# Patient Record
Sex: FEMALE | URBAN - METROPOLITAN AREA
[De-identification: names, ages, dates, MRNs, and addresses within clinical notes are randomized per-mention and may not be internally consistent; named-entity substitution may affect disease eponyms.]

---

## 2021-03-01 ENCOUNTER — OFFICE VISIT (OUTPATIENT)
Dept: URBAN - METROPOLITAN AREA CLINIC 27 | Facility: CLINIC | Age: 71
End: 2021-03-01
Payer: MEDICARE

## 2021-03-01 PROCEDURE — 92235 FLUORESCEIN ANGRPH MLTIFRAME: CPT | Performed by: OPHTHALMOLOGY

## 2021-03-01 PROCEDURE — 99204 OFFICE O/P NEW MOD 45 MIN: CPT | Performed by: OPHTHALMOLOGY

## 2021-03-01 PROCEDURE — 92250 FUNDUS PHOTOGRAPHY W/I&R: CPT | Performed by: OPHTHALMOLOGY

## 2021-03-01 PROCEDURE — 92134 CPTRZ OPH DX IMG PST SGM RTA: CPT | Performed by: OPHTHALMOLOGY

## 2021-03-01 ASSESSMENT — INTRAOCULAR PRESSURE
OD: 10
OS: 12

## 2021-03-01 NOTE — IMPRESSION/PLAN
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 Plan: Exam/photos/OCT show DBH c/w severe NPDR with CSDME OD. FA sweeps 3/1/21  show staining of macular MAs with late leakage, large areas of ischemia w/o NVE. Plan as above. Last A1c 8.8.

## 2021-03-01 NOTE — IMPRESSION/PLAN
Impression: Type 2 diabetes mellitus with proliferative diabetic retinopathy with macular edema, left eye: Y18.9118. Plan: Exam/photos/OCT show NVE/NVD c/w active PDR with CSDME OS. FA sweeps 3/1/21 show leakage in the macula and peripheral ischemia with nasal NVE. Currently, there is no NVI and the IOP is within normal limits. The Dx, NHx, and Px PDR and DME were discussed at length. Reviewed options of observation, anti-VEGF, steroids, and laser; recommend anti-VEGF series along with sector PRP. The patient understands that the goal of treatment is not to improve vision, but to reduce the likelihood of severe vision loss. The patient was urged to work closely with their PCP to avoid systemic complications of diabetic disease. 

1 week, Lucentis OU 1/3
2-3 weeks, nasal PRP OS (PHX-B)

## 2021-03-08 ENCOUNTER — PROCEDURE (OUTPATIENT)
Dept: URBAN - METROPOLITAN AREA CLINIC 27 | Facility: CLINIC | Age: 71
End: 2021-03-08
Payer: MEDICARE

## 2021-03-08 ASSESSMENT — INTRAOCULAR PRESSURE
OD: 14
OS: 15

## 2021-04-05 ENCOUNTER — PROCEDURE (OUTPATIENT)
Dept: URBAN - METROPOLITAN AREA CLINIC 27 | Facility: CLINIC | Age: 71
End: 2021-04-05
Payer: MEDICARE

## 2021-04-05 ASSESSMENT — INTRAOCULAR PRESSURE
OD: 20
OS: 18

## 2021-04-21 ENCOUNTER — PROCEDURE (OUTPATIENT)
Dept: URBAN - METROPOLITAN AREA CLINIC 7 | Facility: CLINIC | Age: 71
End: 2021-04-21
Payer: MEDICARE

## 2021-04-21 PROCEDURE — 67228 TREATMENT X10SV RETINOPATHY: CPT | Performed by: OPHTHALMOLOGY

## 2021-04-21 ASSESSMENT — INTRAOCULAR PRESSURE
OS: 17
OD: 19

## 2021-06-08 ENCOUNTER — OFFICE VISIT (OUTPATIENT)
Dept: URBAN - METROPOLITAN AREA CLINIC 27 | Facility: CLINIC | Age: 71
End: 2021-06-08
Payer: MEDICARE

## 2021-06-08 DIAGNOSIS — E11.3411 TYPE 2 DIABETES MELLITUS WITH SEVERE NONPROLIFERATIVE DIABETIC RETINOPATHY WITH MACULAR EDEMA, RIGHT EYE: ICD-10-CM

## 2021-06-08 DIAGNOSIS — E11.3512 TYPE 2 DIABETES MELLITUS WITH PROLIFERATIVE DIABETIC RETINOPATHY WITH MACULAR EDEMA, LEFT EYE: Primary | ICD-10-CM

## 2021-06-08 PROCEDURE — 92012 INTRM OPH EXAM EST PATIENT: CPT | Performed by: OPHTHALMOLOGY

## 2021-06-08 PROCEDURE — 92134 CPTRZ OPH DX IMG PST SGM RTA: CPT | Performed by: OPHTHALMOLOGY

## 2021-06-08 ASSESSMENT — INTRAOCULAR PRESSURE
OD: 15
OS: 15

## 2021-06-08 NOTE — IMPRESSION/PLAN
Impression: severe NPDR w DME OD Plan: Exam/OCT show severe NPDR with CSDME OD s/p Sandro series. FA sweeps 3/1/21 showed staining of macular MAs with late leakage, large areas of ischemia w/o NVE. Plan as above. Last A1c 8. 8.

## 2021-06-08 NOTE — IMPRESSION/PLAN
Impression: PDR w DME OS
s/p sector PRP 4/21/21 Plan: Exam/OCT show PDR with CSDME OS s/p Sandro series. FA sweeps 3/1/21 showed leakage in the macula and peripheral ischemia with nasal NVE. Reviewed options of observation, anti-VEGF, steroids, and laser; recommend 2nd Lucentis series. Also submit Ozurdex BI for any incomplete response. Cont BS/BP/chol control. 

4 weeks, Lucentis OU 2/3 (repeat FA Sept)

## 2021-07-12 ENCOUNTER — PROCEDURE (OUTPATIENT)
Dept: URBAN - METROPOLITAN AREA CLINIC 27 | Facility: CLINIC | Age: 71
End: 2021-07-12
Payer: MEDICARE

## 2021-07-12 ASSESSMENT — INTRAOCULAR PRESSURE
OS: 13
OD: 14

## 2021-08-16 ENCOUNTER — PROCEDURE (OUTPATIENT)
Dept: URBAN - METROPOLITAN AREA CLINIC 27 | Facility: CLINIC | Age: 71
End: 2021-08-16
Payer: MEDICARE

## 2021-08-16 ASSESSMENT — INTRAOCULAR PRESSURE
OD: 14
OS: 14

## 2021-10-26 ENCOUNTER — OFFICE VISIT (OUTPATIENT)
Dept: URBAN - METROPOLITAN AREA CLINIC 27 | Facility: CLINIC | Age: 71
End: 2021-10-26
Payer: MEDICARE

## 2021-10-26 DIAGNOSIS — H25.13 AGE-RELATED NUCLEAR CATARACT, BILATERAL: ICD-10-CM

## 2021-10-26 PROCEDURE — 92250 FUNDUS PHOTOGRAPHY W/I&R: CPT | Performed by: OPHTHALMOLOGY

## 2021-10-26 PROCEDURE — 99214 OFFICE O/P EST MOD 30 MIN: CPT | Performed by: OPHTHALMOLOGY

## 2021-10-26 PROCEDURE — 92134 CPTRZ OPH DX IMG PST SGM RTA: CPT | Performed by: OPHTHALMOLOGY

## 2021-10-26 PROCEDURE — 92235 FLUORESCEIN ANGRPH MLTIFRAME: CPT | Performed by: OPHTHALMOLOGY

## 2021-10-26 ASSESSMENT — INTRAOCULAR PRESSURE
OS: 14
OD: 15

## 2021-10-26 NOTE — IMPRESSION/PLAN
Impression: severe NPDR w DME OD Plan: Exam/photos/OCT show severe NPDR with CSDME OD. FA sweeps 10/26/21 shows staining of macular MAs with late leakage, large areas of ischemia w/o NVE. Plan as above. Last A1c 8. 8.

## 2021-10-26 NOTE — IMPRESSION/PLAN
Impression: PDR w DME OS
s/p sector PRP 4/21/21 Plan: Pt missed last appt. Exam/photos/OCT show PDR with recurrent vs persistent CSDME OS. FA sweeps 10/26/21 shows leakage in the macula, peripheral ischemia w/o NVE OS. Reviewed options of observation, anti-VEGF, steroids, and laser; recommend Lucentis OU, check response 4 weeks later and switch to Ozurdex if DME is minmally responsive. No auth, will schedule. Cont BS/BP/chol control. 

3 weeks, Lucentis OU; then 4 weeks, OCT OU, reeval DME (Sandro vs Oz)

## 2021-11-15 ENCOUNTER — PROCEDURE (OUTPATIENT)
Dept: URBAN - METROPOLITAN AREA CLINIC 27 | Facility: CLINIC | Age: 71
End: 2021-11-15
Payer: MEDICARE

## 2021-11-15 ASSESSMENT — INTRAOCULAR PRESSURE
OS: 13
OD: 14

## 2022-01-18 ENCOUNTER — OFFICE VISIT (OUTPATIENT)
Dept: URBAN - METROPOLITAN AREA CLINIC 27 | Facility: CLINIC | Age: 72
End: 2022-01-18
Payer: MEDICARE

## 2022-01-18 PROCEDURE — 92134 CPTRZ OPH DX IMG PST SGM RTA: CPT | Performed by: OPHTHALMOLOGY

## 2022-01-18 PROCEDURE — 99212 OFFICE O/P EST SF 10 MIN: CPT | Performed by: OPHTHALMOLOGY

## 2022-01-18 ASSESSMENT — INTRAOCULAR PRESSURE
OD: 21
OS: 18

## 2022-01-18 NOTE — IMPRESSION/PLAN
Impression: severe NPDR w DME OD Plan: Exam/OCT show severe NPDR with CSDME OD. FA sweeps 10/26/21 showed staining of macular MAs with late leakage, large areas of ischemia w/o NVE. Plan as above. Last A1c 8. 8.

## 2022-01-18 NOTE — IMPRESSION/PLAN
Impression: PDR w DME OS
s/p sector PRP 4/21/21 Plan: Pt missed last appt. Exam/OCT show PDR with recurrent vs persistent CSDME OS. FA sweeps 10/26/21 showed leakage in the macula, peripheral ischemia w/o NVE OS. Reviewed options of observation, anti-VEGF, steroids, and laser; given poor compliance with f/u, recommend Ozurdex OU today. Cont BS/BP/chol control. 

4 weeks, OCT OU, reeval DME (Sandro vs Oz)

## 2022-04-26 ENCOUNTER — OFFICE VISIT (OUTPATIENT)
Dept: URBAN - METROPOLITAN AREA CLINIC 27 | Facility: CLINIC | Age: 72
End: 2022-04-26
Payer: MEDICARE

## 2022-04-26 DIAGNOSIS — H25.13 AGE-RELATED NUCLEAR CATARACT, BILATERAL: ICD-10-CM

## 2022-04-26 DIAGNOSIS — E11.3411 TYPE 2 DIABETES MELLITUS WITH SEVERE NONPROLIFERATIVE DIABETIC RETINOPATHY WITH MACULAR EDEMA, RIGHT EYE: ICD-10-CM

## 2022-04-26 DIAGNOSIS — E11.3512 TYPE 2 DIABETES MELLITUS WITH PROLIFERATIVE DIABETIC RETINOPATHY WITH MACULAR EDEMA, LEFT EYE: Primary | ICD-10-CM

## 2022-04-26 PROCEDURE — 92014 COMPRE OPH EXAM EST PT 1/>: CPT | Performed by: OPHTHALMOLOGY

## 2022-04-26 PROCEDURE — 92134 CPTRZ OPH DX IMG PST SGM RTA: CPT | Performed by: OPHTHALMOLOGY

## 2022-04-26 ASSESSMENT — INTRAOCULAR PRESSURE
OD: 15
OS: 13

## 2022-04-26 NOTE — IMPRESSION/PLAN
Impression: PDR w DME OS
 - s/p sector PRP 4/21/21 Plan: Pt missed last appt (COVID). Exam/OCT show PDR with recurrent vs persistent CSDME OS. FA sweeps 10/26/21 showed leakage in the macula, peripheral ischemia w/o NVE OS. Reviewed options of observation, anti-VEGF, steroids, and laser; given poor compliance with f/u, recommend Ozurdex OU today. Cont BS/BP/chol control. 

4 weeks, photos/FA sweeps/OCT OU, reeval DME (Sandro vs Oz)

## 2022-07-25 ENCOUNTER — OFFICE VISIT (OUTPATIENT)
Dept: URBAN - METROPOLITAN AREA CLINIC 27 | Facility: CLINIC | Age: 72
End: 2022-07-25
Payer: MEDICARE

## 2022-07-25 DIAGNOSIS — E11.3411 TYPE 2 DIABETES MELLITUS WITH SEVERE NONPROLIFERATIVE DIABETIC RETINOPATHY WITH MACULAR EDEMA, RIGHT EYE: ICD-10-CM

## 2022-07-25 DIAGNOSIS — H04.129 DRY EYE SYNDROME OF LACRIMAL GLAND: ICD-10-CM

## 2022-07-25 DIAGNOSIS — E11.3512 TYPE 2 DIABETES MELLITUS WITH PROLIFERATIVE DIABETIC RETINOPATHY WITH MACULAR EDEMA, LEFT EYE: Primary | ICD-10-CM

## 2022-07-25 PROCEDURE — 92250 FUNDUS PHOTOGRAPHY W/I&R: CPT | Performed by: OPHTHALMOLOGY

## 2022-07-25 PROCEDURE — 92235 FLUORESCEIN ANGRPH MLTIFRAME: CPT | Performed by: OPHTHALMOLOGY

## 2022-07-25 PROCEDURE — 92012 INTRM OPH EXAM EST PATIENT: CPT | Performed by: OPHTHALMOLOGY

## 2022-07-25 PROCEDURE — 92134 CPTRZ OPH DX IMG PST SGM RTA: CPT | Performed by: OPHTHALMOLOGY

## 2022-07-25 ASSESSMENT — INTRAOCULAR PRESSURE
OD: 14
OS: 16

## 2022-07-25 NOTE — IMPRESSION/PLAN
Impression: severe NPDR w DME OD Plan: Exam/OCT show severe NPDR with CSDME OD. Photos 7/25/22 showed severe NPDR. FA sweeps 7/25/22 showed macular  leakage, large areas of ischemia w/o NVE OD. Plan as above. Last A1c 8. 8.

## 2022-07-25 NOTE — IMPRESSION/PLAN
Impression: PDR w DME OS
 - s/p sector PRP 4/21/21 Plan: Exam/OCT show PDR with CSDME OS. Photos 7/25/22 showed PDR. FA sweeps 7/25/22 showed leakage in the macula, peripheral ischemia w/o nasal NVE OS. Reviewed options of observation, anti-VEGF, steroids, and laser; recommend Ozurdex OU today. Cont BS/BP/chol control. 

6 weeks, OCT OU, reeval DME (Sandro vs Oz)

## 2022-09-26 ENCOUNTER — OFFICE VISIT (OUTPATIENT)
Dept: URBAN - METROPOLITAN AREA CLINIC 27 | Facility: CLINIC | Age: 72
End: 2022-09-26
Payer: MEDICARE

## 2022-09-26 DIAGNOSIS — E11.3411 TYPE 2 DIABETES MELLITUS WITH SEVERE NONPROLIFERATIVE DIABETIC RETINOPATHY WITH MACULAR EDEMA, RIGHT EYE: ICD-10-CM

## 2022-09-26 DIAGNOSIS — E11.3512 TYPE 2 DIABETES MELLITUS WITH PROLIFERATIVE DIABETIC RETINOPATHY WITH MACULAR EDEMA, LEFT EYE: Primary | ICD-10-CM

## 2022-09-26 DIAGNOSIS — H04.129 DRY EYE SYNDROME OF LACRIMAL GLAND: ICD-10-CM

## 2022-09-26 PROCEDURE — 92134 CPTRZ OPH DX IMG PST SGM RTA: CPT | Performed by: OPHTHALMOLOGY

## 2022-09-26 PROCEDURE — 99212 OFFICE O/P EST SF 10 MIN: CPT | Performed by: OPHTHALMOLOGY

## 2022-09-26 ASSESSMENT — INTRAOCULAR PRESSURE
OD: 20
OS: 12

## 2022-09-26 NOTE — IMPRESSION/PLAN
Impression: severe NPDR w DME OD Plan: Exam/OCT show severe NPDR with CSDME OD s/p Ozurdex 7/25/22. Photos 7/25/22 showed severe NPDR. FA sweeps 7/25/22 showed macular  leakage, large areas of ischemia w/o NVE OD. Plan as above. Last A1c 8. 8.

## 2022-09-26 NOTE — IMPRESSION/PLAN
Impression: PDR w DME OS
 - s/p sector PRP 4/21/21 Plan: Exam/OCT show PDR with CSDME OS s/p Ozurdex 7/25/22. Photos 7/25/22 showed PDR. FA sweeps 7/25/22 showed leakage in the macula, peripheral ischemia w/o nasal NVE OS. Reviewed options of observation, anti-VEGF, steroids, and laser; recommend Lucentis OU today. Cont BS/BP/chol control. 

6 weeks, OCT OU, reeval DME (Sandro vs Oz)

## 2022-11-14 ENCOUNTER — OFFICE VISIT (OUTPATIENT)
Dept: URBAN - METROPOLITAN AREA CLINIC 27 | Facility: CLINIC | Age: 72
End: 2022-11-14
Payer: MEDICARE

## 2022-11-14 DIAGNOSIS — E11.3411 TYPE 2 DIABETES MELLITUS WITH SEVERE NONPROLIFERATIVE DIABETIC RETINOPATHY WITH MACULAR EDEMA, RIGHT EYE: ICD-10-CM

## 2022-11-14 DIAGNOSIS — E11.3512 TYPE 2 DIABETES MELLITUS WITH PROLIFERATIVE DIABETIC RETINOPATHY WITH MACULAR EDEMA, LEFT EYE: Primary | ICD-10-CM

## 2022-11-14 DIAGNOSIS — H25.13 AGE-RELATED NUCLEAR CATARACT, BILATERAL: ICD-10-CM

## 2022-11-14 DIAGNOSIS — H04.129 DRY EYE SYNDROME OF LACRIMAL GLAND: ICD-10-CM

## 2022-11-14 PROCEDURE — 99214 OFFICE O/P EST MOD 30 MIN: CPT | Performed by: OPHTHALMOLOGY

## 2022-11-14 PROCEDURE — 92134 CPTRZ OPH DX IMG PST SGM RTA: CPT | Performed by: OPHTHALMOLOGY

## 2022-11-14 ASSESSMENT — INTRAOCULAR PRESSURE
OD: 15
OS: 13

## 2022-11-14 NOTE — IMPRESSION/PLAN
Impression: severe NPDR w DME OD Plan: Exam/OCT show severe NPDR with CSDME OD s/p Ozurdex 7/25/22 and Lucentis 9/26/22. Photos 7/25/22 showed severe NPDR. FA sweeps 7/25/22 showed macular  leakage, large areas of ischemia w/o NVE OD. Plan as above. Last A1c 8. 8.

## 2022-11-14 NOTE — IMPRESSION/PLAN
Impression: PDR w DME OS
 - s/p sector PRP 4/21/21 Plan: Exam/OCT show PDR with CSDME OS s/p Ozurdex 7/25/22 and Lucentis 9/26/22. Photos 7/25/22 showed PDR. FA sweeps 7/25/22 showed leakage in the macula, peripheral ischemia w/o nasal NVE OS. Reviewed options of observation, anti-VEGF, steroids, and laser; recommend Ozurdex OU today. Given persistence, submit Vabysmo BI. Cont BS/BP/chol control. 

4-6 weeks, OCT OU, reeval DME (Vabysmo vs Oz)

## 2023-02-28 ENCOUNTER — OFFICE VISIT (OUTPATIENT)
Dept: URBAN - METROPOLITAN AREA CLINIC 27 | Facility: CLINIC | Age: 73
End: 2023-02-28
Payer: MEDICARE

## 2023-02-28 DIAGNOSIS — H04.129 DRY EYE SYNDROME OF LACRIMAL GLAND: ICD-10-CM

## 2023-02-28 DIAGNOSIS — E11.3411 TYPE 2 DIABETES MELLITUS WITH SEVERE NONPROLIFERATIVE DIABETIC RETINOPATHY WITH MACULAR EDEMA, RIGHT EYE: ICD-10-CM

## 2023-02-28 DIAGNOSIS — E11.3512 TYPE 2 DIABETES MELLITUS WITH PROLIFERATIVE DIABETIC RETINOPATHY WITH MACULAR EDEMA, LEFT EYE: Primary | ICD-10-CM

## 2023-02-28 PROCEDURE — 99213 OFFICE O/P EST LOW 20 MIN: CPT | Performed by: OPHTHALMOLOGY

## 2023-02-28 PROCEDURE — 92134 CPTRZ OPH DX IMG PST SGM RTA: CPT | Performed by: OPHTHALMOLOGY

## 2023-02-28 ASSESSMENT — INTRAOCULAR PRESSURE
OD: 18
OS: 19

## 2023-02-28 NOTE — IMPRESSION/PLAN
Impression: severe NPDR w DME OD Plan: Exam/OCT show severe NPDR with CSDME OD s/p Ozurdex 11/14/22. Photos 7/25/22 showed severe NPDR. FA sweeps 7/25/22 showed macular  leakage, large areas of ischemia w/o NVE OD. Plan as above. Last A1c 8. 8.

## 2023-02-28 NOTE — IMPRESSION/PLAN
Impression: PDR w DME OS
 - s/p sector PRP 4/21/21 Plan: Pt lost to f/u. Exam/OCT show PDR with CSDME OS s/p Ozurdex 11/14/22. Photos 7/25/22 showed PDR. FA sweeps 7/25/22 showed leakage in the macula, peripheral ischemia w/o nasal NVE OS. Reviewed options of observation, anti-VEGF, steroids, and laser; recommend Vabysmo series OU. Cont BS/BP/chol control. 

4-6 weeks, OCT/Vabysmo OU 2/3

## 2023-05-15 ENCOUNTER — OFFICE VISIT (OUTPATIENT)
Dept: URBAN - METROPOLITAN AREA CLINIC 27 | Facility: CLINIC | Age: 73
End: 2023-05-15
Payer: MEDICARE

## 2023-05-15 DIAGNOSIS — E11.3411 TYPE 2 DIABETES MELLITUS WITH SEVERE NONPROLIFERATIVE DIABETIC RETINOPATHY WITH MACULAR EDEMA, RIGHT EYE: ICD-10-CM

## 2023-05-15 DIAGNOSIS — E11.3512 TYPE 2 DIABETES MELLITUS WITH PROLIFERATIVE DIABETIC RETINOPATHY WITH MACULAR EDEMA, LEFT EYE: Primary | ICD-10-CM

## 2023-05-15 PROCEDURE — 92134 CPTRZ OPH DX IMG PST SGM RTA: CPT | Performed by: OPHTHALMOLOGY

## 2023-05-15 ASSESSMENT — INTRAOCULAR PRESSURE
OD: 18
OS: 19

## 2023-06-26 ENCOUNTER — PROCEDURE (OUTPATIENT)
Dept: URBAN - METROPOLITAN AREA CLINIC 27 | Facility: CLINIC | Age: 73
End: 2023-06-26
Payer: MEDICARE

## 2023-06-26 DIAGNOSIS — E11.3411 TYPE 2 DIABETES MELLITUS WITH SEVERE NONPROLIFERATIVE DIABETIC RETINOPATHY WITH MACULAR EDEMA, RIGHT EYE: ICD-10-CM

## 2023-06-26 DIAGNOSIS — E11.3512 TYPE 2 DIABETES MELLITUS WITH PROLIFERATIVE DIABETIC RETINOPATHY WITH MACULAR EDEMA, LEFT EYE: Primary | ICD-10-CM

## 2023-06-26 PROCEDURE — 92134 CPTRZ OPH DX IMG PST SGM RTA: CPT | Performed by: OPHTHALMOLOGY

## 2023-06-26 ASSESSMENT — INTRAOCULAR PRESSURE
OS: 14
OD: 19

## 2023-06-26 NOTE — IMPRESSION/PLAN
Impression: PDR w DME OS
 - s/p sector PRP 4/21/21 Plan: Exam/OCT show PDR with temporal CSDME OS. Photos 7/25/22 showed PDR. FA sweeps 7/25/22 showed leakage in the macula, peripheral ischemia w/o nasal NVE OS. Reviewed options of observation, anti-VEGF, steroids, and laser; recommend Vabysmo OU. Cont BS/BP/chol control. 

4-6 weeks, photos/FA sweeps/OCT OU, reeval DME (Vabysmo vs Ozurdex)

## 2023-06-26 NOTE — IMPRESSION/PLAN
Impression: severe NPDR w DME OD Plan: Exam/OCT show severe NPDR with temporal CSDME OD. Photos 7/25/22 showed severe NPDR. FA sweeps 7/25/22 showed macular  leakage, large areas of ischemia w/o NVE OD. Plan as above. Last A1c 8. 8.

## 2023-09-11 ENCOUNTER — OFFICE VISIT (OUTPATIENT)
Dept: URBAN - METROPOLITAN AREA CLINIC 27 | Facility: CLINIC | Age: 73
End: 2023-09-11
Payer: MEDICARE

## 2023-09-11 DIAGNOSIS — E11.3411 TYPE 2 DIABETES MELLITUS WITH SEVERE NONPROLIFERATIVE DIABETIC RETINOPATHY WITH MACULAR EDEMA, RIGHT EYE: ICD-10-CM

## 2023-09-11 DIAGNOSIS — H25.13 AGE-RELATED NUCLEAR CATARACT, BILATERAL: ICD-10-CM

## 2023-09-11 DIAGNOSIS — H04.129 DRY EYE SYNDROME OF LACRIMAL GLAND: ICD-10-CM

## 2023-09-11 DIAGNOSIS — E11.3512 TYPE 2 DIABETES MELLITUS WITH PROLIFERATIVE DIABETIC RETINOPATHY WITH MACULAR EDEMA, LEFT EYE: Primary | ICD-10-CM

## 2023-09-11 PROCEDURE — 92235 FLUORESCEIN ANGRPH MLTIFRAME: CPT | Performed by: OPHTHALMOLOGY

## 2023-09-11 PROCEDURE — 92134 CPTRZ OPH DX IMG PST SGM RTA: CPT | Performed by: OPHTHALMOLOGY

## 2023-09-11 PROCEDURE — 92250 FUNDUS PHOTOGRAPHY W/I&R: CPT | Performed by: OPHTHALMOLOGY

## 2023-09-11 PROCEDURE — 99214 OFFICE O/P EST MOD 30 MIN: CPT | Performed by: OPHTHALMOLOGY

## 2023-09-11 ASSESSMENT — INTRAOCULAR PRESSURE
OS: 14
OD: 15

## 2023-12-27 ENCOUNTER — OFFICE VISIT (OUTPATIENT)
Dept: URBAN - METROPOLITAN AREA CLINIC 27 | Facility: CLINIC | Age: 73
End: 2023-12-27
Payer: MEDICARE

## 2023-12-27 DIAGNOSIS — E11.3411 TYPE 2 DIABETES MELLITUS WITH SEVERE NONPROLIFERATIVE DIABETIC RETINOPATHY WITH MACULAR EDEMA, RIGHT EYE: ICD-10-CM

## 2023-12-27 DIAGNOSIS — H04.129 DRY EYE SYNDROME OF LACRIMAL GLAND: ICD-10-CM

## 2023-12-27 DIAGNOSIS — E11.3512 TYPE 2 DIABETES MELLITUS WITH PROLIFERATIVE DIABETIC RETINOPATHY WITH MACULAR EDEMA, LEFT EYE: Primary | ICD-10-CM

## 2023-12-27 PROCEDURE — 92134 CPTRZ OPH DX IMG PST SGM RTA: CPT | Performed by: OPHTHALMOLOGY

## 2023-12-27 PROCEDURE — 99213 OFFICE O/P EST LOW 20 MIN: CPT | Performed by: OPHTHALMOLOGY

## 2023-12-27 ASSESSMENT — INTRAOCULAR PRESSURE
OS: 19
OD: 22

## 2024-05-01 ENCOUNTER — OFFICE VISIT (OUTPATIENT)
Dept: URBAN - METROPOLITAN AREA CLINIC 27 | Facility: CLINIC | Age: 74
End: 2024-05-01
Payer: MEDICARE

## 2024-05-01 DIAGNOSIS — H25.13 AGE-RELATED NUCLEAR CATARACT, BILATERAL: ICD-10-CM

## 2024-05-01 DIAGNOSIS — E11.3512 TYPE 2 DIABETES MELLITUS WITH PROLIFERATIVE DIABETIC RETINOPATHY WITH MACULAR EDEMA, LEFT EYE: Primary | ICD-10-CM

## 2024-05-01 DIAGNOSIS — E11.3411 TYPE 2 DIABETES MELLITUS WITH SEVERE NONPROLIFERATIVE DIABETIC RETINOPATHY WITH MACULAR EDEMA, RIGHT EYE: ICD-10-CM

## 2024-05-01 DIAGNOSIS — H04.129 DRY EYE SYNDROME OF LACRIMAL GLAND: ICD-10-CM

## 2024-05-01 PROCEDURE — 99214 OFFICE O/P EST MOD 30 MIN: CPT | Performed by: OPHTHALMOLOGY

## 2024-05-01 PROCEDURE — 92134 CPTRZ OPH DX IMG PST SGM RTA: CPT | Performed by: OPHTHALMOLOGY

## 2024-05-01 ASSESSMENT — INTRAOCULAR PRESSURE
OD: 20
OS: 21

## 2024-06-12 ENCOUNTER — OFFICE VISIT (OUTPATIENT)
Dept: URBAN - METROPOLITAN AREA CLINIC 27 | Facility: CLINIC | Age: 74
End: 2024-06-12
Payer: MEDICARE

## 2024-06-12 DIAGNOSIS — E11.3512 TYPE 2 DIABETES MELLITUS WITH PROLIFERATIVE DIABETIC RETINOPATHY WITH MACULAR EDEMA, LEFT EYE: Primary | ICD-10-CM

## 2024-06-12 PROCEDURE — 92134 CPTRZ OPH DX IMG PST SGM RTA: CPT | Performed by: OPHTHALMOLOGY

## 2024-06-12 PROCEDURE — 67028 INJECTION EYE DRUG: CPT | Performed by: OPHTHALMOLOGY

## 2024-06-12 ASSESSMENT — INTRAOCULAR PRESSURE
OS: 15
OD: 21

## 2024-06-17 ENCOUNTER — OFFICE VISIT (OUTPATIENT)
Dept: URBAN - METROPOLITAN AREA CLINIC 24 | Facility: CLINIC | Age: 74
End: 2024-06-17
Payer: MEDICARE

## 2024-06-17 DIAGNOSIS — E11.3512 TYPE 2 DIABETES MELLITUS WITH PROLIFERATIVE DIABETIC RETINOPATHY WITH MACULAR EDEMA, LEFT EYE: Primary | ICD-10-CM

## 2024-06-17 DIAGNOSIS — E11.3411 TYPE 2 DIABETES MELLITUS WITH SEVERE NONPROLIFERATIVE DIABETIC RETINOPATHY WITH MACULAR EDEMA, RIGHT EYE: ICD-10-CM

## 2024-06-17 DIAGNOSIS — H25.813 COMBINED FORMS OF AGE-RELATED CATARACT, BILATERAL: ICD-10-CM

## 2024-06-17 DIAGNOSIS — H04.129 DRY EYE SYNDROME OF LACRIMAL GLAND: ICD-10-CM

## 2024-06-17 PROCEDURE — 99204 OFFICE O/P NEW MOD 45 MIN: CPT | Performed by: STUDENT IN AN ORGANIZED HEALTH CARE EDUCATION/TRAINING PROGRAM

## 2024-06-17 PROCEDURE — 92250 FUNDUS PHOTOGRAPHY W/I&R: CPT | Performed by: STUDENT IN AN ORGANIZED HEALTH CARE EDUCATION/TRAINING PROGRAM

## 2024-06-17 PROCEDURE — 92134 CPTRZ OPH DX IMG PST SGM RTA: CPT | Performed by: STUDENT IN AN ORGANIZED HEALTH CARE EDUCATION/TRAINING PROGRAM

## 2024-06-17 ASSESSMENT — INTRAOCULAR PRESSURE
OS: 17
OD: 17

## 2024-06-17 ASSESSMENT — VISUAL ACUITY
OD: CF 5FT
OS: 20/200

## 2024-06-17 ASSESSMENT — KERATOMETRY
OD: 43.65
OS: 44.02

## 2024-08-19 ENCOUNTER — OFFICE VISIT (OUTPATIENT)
Dept: URBAN - METROPOLITAN AREA CLINIC 27 | Facility: CLINIC | Age: 74
End: 2024-08-19
Payer: MEDICARE

## 2024-08-19 DIAGNOSIS — E11.3411 TYPE 2 DIABETES MELLITUS WITH SEVERE NONPROLIFERATIVE DIABETIC RETINOPATHY WITH MACULAR EDEMA, RIGHT EYE: ICD-10-CM

## 2024-08-19 DIAGNOSIS — H04.129 DRY EYE SYNDROME OF LACRIMAL GLAND: ICD-10-CM

## 2024-08-19 DIAGNOSIS — E11.3512 TYPE 2 DIABETES MELLITUS WITH PROLIFERATIVE DIABETIC RETINOPATHY WITH MACULAR EDEMA, LEFT EYE: Primary | ICD-10-CM

## 2024-08-19 PROCEDURE — 67028 INJECTION EYE DRUG: CPT | Performed by: OPHTHALMOLOGY

## 2024-08-19 PROCEDURE — 92235 FLUORESCEIN ANGRPH MLTIFRAME: CPT | Performed by: OPHTHALMOLOGY

## 2024-08-19 PROCEDURE — 99213 OFFICE O/P EST LOW 20 MIN: CPT | Performed by: OPHTHALMOLOGY

## 2024-08-19 PROCEDURE — 92134 CPTRZ OPH DX IMG PST SGM RTA: CPT | Performed by: OPHTHALMOLOGY

## 2024-08-19 ASSESSMENT — INTRAOCULAR PRESSURE
OS: 12
OD: 15

## 2024-08-22 ENCOUNTER — ADULT PHYSICAL (OUTPATIENT)
Dept: URBAN - METROPOLITAN AREA CLINIC 24 | Facility: CLINIC | Age: 74
End: 2024-08-22
Payer: MEDICARE

## 2024-08-22 DIAGNOSIS — H25.13 AGE-RELATED NUCLEAR CATARACT, BILATERAL: ICD-10-CM

## 2024-08-22 DIAGNOSIS — H25.813 COMBINED FORMS OF AGE-RELATED CATARACT, BILATERAL: Primary | ICD-10-CM

## 2024-08-22 DIAGNOSIS — Z01.818 ENCOUNTER FOR OTHER PREPROCEDURAL EXAMINATION: Primary | ICD-10-CM

## 2024-08-22 PROCEDURE — 99203 OFFICE O/P NEW LOW 30 MIN: CPT

## 2024-08-22 RX ORDER — GLIPIZIDE 5 MG/1
5 MG TABLET ORAL
Qty: 0 | Refills: 0 | Status: ACTIVE
Start: 2024-08-22

## 2024-08-22 RX ORDER — SEMAGLUTIDE 0.68 MG/ML
INJECTION, SOLUTION SUBCUTANEOUS
Qty: 0 | Refills: 0 | Status: ACTIVE
Start: 2024-08-22

## 2024-08-28 ENCOUNTER — OFFICE VISIT (OUTPATIENT)
Dept: URBAN - METROPOLITAN AREA CLINIC 24 | Facility: CLINIC | Age: 74
End: 2024-08-28
Payer: MEDICARE

## 2024-08-28 DIAGNOSIS — H04.129 DRY EYE SYNDROME OF LACRIMAL GLAND: ICD-10-CM

## 2024-08-28 DIAGNOSIS — E11.3512 TYPE 2 DIABETES MELLITUS WITH PROLIFERATIVE DIABETIC RETINOPATHY WITH MACULAR EDEMA, LEFT EYE: ICD-10-CM

## 2024-08-28 DIAGNOSIS — E11.3411 TYPE 2 DIABETES MELLITUS WITH SEVERE NONPROLIFERATIVE DIABETIC RETINOPATHY WITH MACULAR EDEMA, RIGHT EYE: ICD-10-CM

## 2024-08-28 DIAGNOSIS — H43.393 OTHER VITREOUS OPACITIES, BILATERAL: ICD-10-CM

## 2024-08-28 DIAGNOSIS — H52.203 BILATERAL ASTIGMATISM: ICD-10-CM

## 2024-08-28 DIAGNOSIS — H25.813 COMBINED FORMS OF AGE-RELATED CATARACT, BILATERAL: Primary | ICD-10-CM

## 2024-08-28 PROCEDURE — 99204 OFFICE O/P NEW MOD 45 MIN: CPT | Performed by: OPHTHALMOLOGY

## 2024-08-28 PROCEDURE — 92136 OPHTHALMIC BIOMETRY: CPT | Performed by: OPHTHALMOLOGY

## 2024-08-28 RX ORDER — PREDNISOLONE ACETATE 10 MG/ML
1 % SUSPENSION/ DROPS OPHTHALMIC
Qty: 5 | Refills: 2 | Status: ACTIVE
Start: 2024-08-28

## 2024-08-28 RX ORDER — DICLOFENAC SODIUM 1 MG/ML
0.1 % SOLUTION/ DROPS OPHTHALMIC
Qty: 5 | Refills: 2 | Status: ACTIVE
Start: 2024-08-28

## 2024-08-28 ASSESSMENT — INTRAOCULAR PRESSURE
OD: 16
OS: 16

## 2024-09-18 ENCOUNTER — SURGERY (OUTPATIENT)
Dept: URBAN - METROPOLITAN AREA SURGERY 12 | Facility: SURGERY | Age: 74
End: 2024-09-18
Payer: MEDICARE

## 2024-09-18 PROCEDURE — 66984 XCAPSL CTRC RMVL W/O ECP: CPT | Performed by: OPHTHALMOLOGY

## 2024-09-23 ENCOUNTER — OFFICE VISIT (OUTPATIENT)
Dept: URBAN - METROPOLITAN AREA CLINIC 27 | Facility: CLINIC | Age: 74
End: 2024-09-23
Payer: MEDICARE

## 2024-09-23 DIAGNOSIS — Z48.810 ENCOUNTER FOR SURGICAL AFTERCARE FOLLOWING SURGERY ON A SENSE ORGAN: ICD-10-CM

## 2024-09-23 DIAGNOSIS — E11.3411 TYPE 2 DIABETES MELLITUS WITH SEVERE NONPROLIFERATIVE DIABETIC RETINOPATHY WITH MACULAR EDEMA, RIGHT EYE: ICD-10-CM

## 2024-09-23 DIAGNOSIS — E11.3512 TYPE 2 DIABETES MELLITUS WITH PROLIFERATIVE DIABETIC RETINOPATHY WITH MACULAR EDEMA, LEFT EYE: Primary | ICD-10-CM

## 2024-09-23 PROCEDURE — 67028 INJECTION EYE DRUG: CPT | Performed by: OPHTHALMOLOGY

## 2024-09-23 PROCEDURE — 92134 CPTRZ OPH DX IMG PST SGM RTA: CPT | Performed by: OPHTHALMOLOGY

## 2024-09-23 ASSESSMENT — INTRAOCULAR PRESSURE
OS: 17
OD: 18

## 2024-10-09 ENCOUNTER — POST-OPERATIVE VISIT (OUTPATIENT)
Dept: URBAN - METROPOLITAN AREA CLINIC 24 | Facility: CLINIC | Age: 74
End: 2024-10-09
Payer: MEDICARE

## 2024-10-09 DIAGNOSIS — H52.4 PRESBYOPIA: ICD-10-CM

## 2024-10-09 DIAGNOSIS — Z48.810 ENCOUNTER FOR SURGICAL AFTERCARE FOLLOWING SURGERY ON A SENSE ORGAN: Primary | ICD-10-CM

## 2024-10-09 PROCEDURE — 92015 DETERMINE REFRACTIVE STATE: CPT | Performed by: STUDENT IN AN ORGANIZED HEALTH CARE EDUCATION/TRAINING PROGRAM

## 2024-10-09 PROCEDURE — 99024 POSTOP FOLLOW-UP VISIT: CPT | Performed by: STUDENT IN AN ORGANIZED HEALTH CARE EDUCATION/TRAINING PROGRAM

## 2024-10-09 ASSESSMENT — VISUAL ACUITY
OS: CF 1FT
OD: 20/60

## 2024-10-09 ASSESSMENT — INTRAOCULAR PRESSURE
OD: 17
OS: 17

## 2024-10-16 ENCOUNTER — SURGERY (OUTPATIENT)
Dept: URBAN - METROPOLITAN AREA SURGERY 12 | Facility: SURGERY | Age: 74
End: 2024-10-16
Payer: MEDICARE

## 2024-10-16 PROCEDURE — 66984 XCAPSL CTRC RMVL W/O ECP: CPT | Performed by: OPHTHALMOLOGY

## 2024-10-17 ENCOUNTER — POST-OPERATIVE VISIT (OUTPATIENT)
Dept: URBAN - METROPOLITAN AREA CLINIC 24 | Facility: CLINIC | Age: 74
End: 2024-10-17
Payer: MEDICARE

## 2024-10-17 DIAGNOSIS — Z96.1 PRESENCE OF INTRAOCULAR LENS: Primary | ICD-10-CM

## 2024-10-17 PROCEDURE — 99024 POSTOP FOLLOW-UP VISIT: CPT | Performed by: STUDENT IN AN ORGANIZED HEALTH CARE EDUCATION/TRAINING PROGRAM

## 2024-10-17 ASSESSMENT — INTRAOCULAR PRESSURE: OS: 16

## 2024-11-25 ENCOUNTER — POST-OPERATIVE VISIT (OUTPATIENT)
Dept: URBAN - METROPOLITAN AREA CLINIC 24 | Facility: CLINIC | Age: 74
End: 2024-11-25
Payer: MEDICARE

## 2024-11-25 DIAGNOSIS — Z96.1 PRESENCE OF INTRAOCULAR LENS: ICD-10-CM

## 2024-11-25 DIAGNOSIS — H52.4 PRESBYOPIA: Primary | ICD-10-CM

## 2024-11-25 PROCEDURE — 99024 POSTOP FOLLOW-UP VISIT: CPT | Performed by: STUDENT IN AN ORGANIZED HEALTH CARE EDUCATION/TRAINING PROGRAM

## 2024-11-25 ASSESSMENT — VISUAL ACUITY
OD: 20/70
OS: 20/25

## 2024-11-25 ASSESSMENT — INTRAOCULAR PRESSURE
OS: 16
OD: 18

## 2025-03-03 ENCOUNTER — OFFICE VISIT (OUTPATIENT)
Dept: URBAN - METROPOLITAN AREA CLINIC 27 | Facility: CLINIC | Age: 75
End: 2025-03-03
Payer: MEDICARE

## 2025-03-03 DIAGNOSIS — H04.129 DRY EYE SYNDROME OF LACRIMAL GLAND: ICD-10-CM

## 2025-03-03 DIAGNOSIS — E11.3512 TYPE 2 DIABETES MELLITUS WITH PROLIFERATIVE DIABETIC RETINOPATHY WITH MACULAR EDEMA, LEFT EYE: Primary | ICD-10-CM

## 2025-03-03 DIAGNOSIS — Z96.1 PRESENCE OF INTRAOCULAR LENS: ICD-10-CM

## 2025-03-03 DIAGNOSIS — E11.3411 TYPE 2 DIABETES MELLITUS WITH SEVERE NONPROLIFERATIVE DIABETIC RETINOPATHY WITH MACULAR EDEMA, RIGHT EYE: ICD-10-CM

## 2025-03-03 ASSESSMENT — INTRAOCULAR PRESSURE
OD: 16
OS: 15

## 2025-06-30 ENCOUNTER — OFFICE VISIT (OUTPATIENT)
Dept: URBAN - METROPOLITAN AREA CLINIC 27 | Facility: CLINIC | Age: 75
End: 2025-06-30
Payer: MEDICARE

## 2025-06-30 DIAGNOSIS — E11.3411 TYPE 2 DIABETES MELLITUS WITH SEVERE NONPROLIFERATIVE DIABETIC RETINOPATHY WITH MACULAR EDEMA, RIGHT EYE: ICD-10-CM

## 2025-06-30 DIAGNOSIS — H04.129 DRY EYE SYNDROME OF LACRIMAL GLAND: ICD-10-CM

## 2025-06-30 DIAGNOSIS — E11.3512 TYPE 2 DIABETES MELLITUS WITH PROLIFERATIVE DIABETIC RETINOPATHY WITH MACULAR EDEMA, LEFT EYE: Primary | ICD-10-CM

## 2025-06-30 PROCEDURE — 92235 FLUORESCEIN ANGRPH MLTIFRAME: CPT | Performed by: OPHTHALMOLOGY

## 2025-06-30 PROCEDURE — 67028 INJECTION EYE DRUG: CPT | Performed by: OPHTHALMOLOGY

## 2025-06-30 PROCEDURE — 99213 OFFICE O/P EST LOW 20 MIN: CPT | Performed by: OPHTHALMOLOGY

## 2025-06-30 PROCEDURE — 92134 CPTRZ OPH DX IMG PST SGM RTA: CPT | Performed by: OPHTHALMOLOGY

## 2025-06-30 ASSESSMENT — INTRAOCULAR PRESSURE
OS: 10
OD: 11

## 2025-08-13 ENCOUNTER — OFFICE VISIT (OUTPATIENT)
Dept: URBAN - METROPOLITAN AREA CLINIC 27 | Facility: CLINIC | Age: 75
End: 2025-08-13
Payer: MEDICARE

## 2025-08-13 DIAGNOSIS — E11.3411 TYPE 2 DIABETES MELLITUS WITH SEVERE NONPROLIFERATIVE DIABETIC RETINOPATHY WITH MACULAR EDEMA, RIGHT EYE: ICD-10-CM

## 2025-08-13 DIAGNOSIS — E11.3512 TYPE 2 DIABETES MELLITUS WITH PROLIFERATIVE DIABETIC RETINOPATHY WITH MACULAR EDEMA, LEFT EYE: Primary | ICD-10-CM

## 2025-08-13 DIAGNOSIS — Z96.1 PRESENCE OF INTRAOCULAR LENS: ICD-10-CM

## 2025-08-13 DIAGNOSIS — H04.129 DRY EYE SYNDROME OF LACRIMAL GLAND: ICD-10-CM

## 2025-08-13 PROCEDURE — 92134 CPTRZ OPH DX IMG PST SGM RTA: CPT | Performed by: OPHTHALMOLOGY

## 2025-08-13 PROCEDURE — 67028 INJECTION EYE DRUG: CPT | Performed by: OPHTHALMOLOGY

## 2025-08-13 PROCEDURE — 99214 OFFICE O/P EST MOD 30 MIN: CPT | Performed by: OPHTHALMOLOGY

## 2025-08-13 ASSESSMENT — INTRAOCULAR PRESSURE
OS: 13
OD: 14